# Patient Record
Sex: FEMALE | Race: WHITE | Employment: UNEMPLOYED | URBAN - METROPOLITAN AREA
[De-identification: names, ages, dates, MRNs, and addresses within clinical notes are randomized per-mention and may not be internally consistent; named-entity substitution may affect disease eponyms.]

---

## 2018-12-14 ENCOUNTER — OFFICE VISIT (OUTPATIENT)
Dept: FAMILY MEDICINE CLINIC | Facility: CLINIC | Age: 38
End: 2018-12-14

## 2018-12-14 ENCOUNTER — TELEPHONE (OUTPATIENT)
Dept: OBGYN CLINIC | Facility: CLINIC | Age: 38
End: 2018-12-14

## 2018-12-14 VITALS
BODY MASS INDEX: 17.97 KG/M2 | HEIGHT: 63.7 IN | TEMPERATURE: 98 F | WEIGHT: 104 LBS | HEART RATE: 90 BPM | DIASTOLIC BLOOD PRESSURE: 65 MMHG | SYSTOLIC BLOOD PRESSURE: 101 MMHG

## 2018-12-14 DIAGNOSIS — Z32.01 PREGNANCY TEST POSITIVE: Primary | ICD-10-CM

## 2018-12-14 PROCEDURE — 99202 OFFICE O/P NEW SF 15 MIN: CPT | Performed by: FAMILY MEDICINE

## 2018-12-14 PROCEDURE — 99212 OFFICE O/P EST SF 10 MIN: CPT | Performed by: FAMILY MEDICINE

## 2018-12-14 PROCEDURE — 81025 URINE PREGNANCY TEST: CPT | Performed by: FAMILY MEDICINE

## 2018-12-14 RX ORDER — SWAB
1 SWAB, NON-MEDICATED MISCELLANEOUS DAILY
Qty: 90 TABLET | Refills: 3 | Status: SHIPPED | OUTPATIENT
Start: 2018-12-14 | End: 2019-01-13

## 2018-12-14 NOTE — PROGRESS NOTES
HPI:    Patient ID: Jair Jiang is a 45year old female. Patient had positive home pregnancy test.         Review of Systems   Constitutional: Negative. Negative for activity change, chills, diaphoresis and fatigue. HENT: Negative.   Negative for c

## 2018-12-14 NOTE — TELEPHONE ENCOUNTER
Pt states that she had a home positive and a positive test with Dr. Demetrio Fields. Pt agrees to see all the MDs. Pt will call if she has any problems. lmp 11/10. Pt states that her periods are every 28-30 days. Pt is taking a pnv.

## 2019-01-07 ENCOUNTER — NURSE ONLY (OUTPATIENT)
Dept: OBGYN CLINIC | Facility: CLINIC | Age: 39
End: 2019-01-07

## 2019-01-07 ENCOUNTER — TELEPHONE (OUTPATIENT)
Dept: OBGYN CLINIC | Facility: CLINIC | Age: 39
End: 2019-01-07

## 2019-01-07 VITALS — WEIGHT: 106.38 LBS | BODY MASS INDEX: 18.39 KG/M2 | HEIGHT: 63.9 IN

## 2019-01-07 DIAGNOSIS — Z34.81 ENCOUNTER FOR SUPERVISION OF OTHER NORMAL PREGNANCY IN FIRST TRIMESTER: Primary | ICD-10-CM

## 2019-01-07 DIAGNOSIS — O09.521 MULTIGRAVIDA OF ADVANCED MATERNAL AGE IN FIRST TRIMESTER: Primary | ICD-10-CM

## 2019-01-07 NOTE — PROGRESS NOTES
Pt seen for OBN appt today with no complaints. Normal PN labs ordered plus 1 hr gtt, 24 hour urine protein, and hgba1c. Pt advised all labs must be completed and resulted prior to MD appt. Pt accepted new ob appt with [facility] on 1/16.      Partner's name is Retardation/Autism No    Muscular Dystrophy No    Neural tube defects No    Sickle Cell Disease or trait No    Tigre-Sachs Disease No    Thalassemia No    Other inherited genetic or chromosomal disorders No    Patient or baby's father had a child with birth

## 2019-01-09 ENCOUNTER — LAB ENCOUNTER (OUTPATIENT)
Dept: LAB | Facility: HOSPITAL | Age: 39
End: 2019-01-09
Attending: OBSTETRICS & GYNECOLOGY
Payer: COMMERCIAL

## 2019-01-09 ENCOUNTER — TELEPHONE (OUTPATIENT)
Dept: OBGYN CLINIC | Facility: CLINIC | Age: 39
End: 2019-01-09

## 2019-01-09 DIAGNOSIS — Z34.81 ENCOUNTER FOR SUPERVISION OF OTHER NORMAL PREGNANCY IN FIRST TRIMESTER: ICD-10-CM

## 2019-01-09 LAB
ANTIBODY SCREEN: NEGATIVE
BASOPHILS # BLD: 0 K/UL (ref 0–0.2)
BASOPHILS NFR BLD: 1 %
EOSINOPHIL # BLD: 0.1 K/UL (ref 0–0.7)
EOSINOPHIL NFR BLD: 2 %
ERYTHROCYTE [DISTWIDTH] IN BLOOD BY AUTOMATED COUNT: 13.8 % (ref 11–15)
EST. AVERAGE GLUCOSE BLD GHB EST-MCNC: 100 MG/DL (ref 68–126)
GLUCOSE 1H P 50 G GLC PO SERPL-MCNC: 140 MG/DL
HBA1C MFR BLD HPLC: 5.1 % (ref ?–5.7)
HBV SURFACE AG SERPL QL IA: NONREACTIVE
HCT VFR BLD AUTO: 37.5 % (ref 35–48)
HGB BLD-MCNC: 12.7 G/DL (ref 12–16)
HIV1+2 AB SERPL QL IA: NONREACTIVE
LYMPHOCYTES # BLD: 1.7 K/UL (ref 1–4)
LYMPHOCYTES NFR BLD: 26 %
MCH RBC QN AUTO: 31.7 PG (ref 27–32)
MCHC RBC AUTO-ENTMCNC: 33.9 G/DL (ref 32–37)
MCV RBC AUTO: 93.4 FL (ref 80–100)
MONOCYTES # BLD: 0.4 K/UL (ref 0–1)
MONOCYTES NFR BLD: 6 %
NEUTROPHILS # BLD AUTO: 4.4 K/UL (ref 1.8–7.7)
NEUTROPHILS NFR BLD: 65 %
PLATELET # BLD AUTO: 207 K/UL (ref 140–400)
PMV BLD AUTO: 8.8 FL (ref 7.4–10.3)
RBC # BLD AUTO: 4.01 M/UL (ref 3.7–5.4)
RH BLOOD TYPE: POSITIVE
RUBV IGG SER-ACNC: 28.8 IU/ML
T PALLIDUM AB SER QL: NEGATIVE
WBC # BLD AUTO: 6.7 K/UL (ref 4–11)

## 2019-01-09 PROCEDURE — 36415 COLL VENOUS BLD VENIPUNCTURE: CPT

## 2019-01-09 PROCEDURE — 87389 HIV-1 AG W/HIV-1&-2 AB AG IA: CPT

## 2019-01-09 PROCEDURE — 87086 URINE CULTURE/COLONY COUNT: CPT

## 2019-01-09 PROCEDURE — 86850 RBC ANTIBODY SCREEN: CPT

## 2019-01-09 PROCEDURE — 86780 TREPONEMA PALLIDUM: CPT

## 2019-01-09 PROCEDURE — 83036 HEMOGLOBIN GLYCOSYLATED A1C: CPT

## 2019-01-09 PROCEDURE — 85025 COMPLETE CBC W/AUTO DIFF WBC: CPT

## 2019-01-09 PROCEDURE — 86762 RUBELLA ANTIBODY: CPT

## 2019-01-09 PROCEDURE — 87340 HEPATITIS B SURFACE AG IA: CPT

## 2019-01-09 PROCEDURE — 82950 GLUCOSE TEST: CPT

## 2019-01-09 PROCEDURE — 86901 BLOOD TYPING SEROLOGIC RH(D): CPT

## 2019-01-09 PROCEDURE — 86900 BLOOD TYPING SEROLOGIC ABO: CPT

## 2019-01-09 NOTE — TELEPHONE ENCOUNTER
I saw pt for OBN on 1/7 and ordered 24 hour urine protein for her. Realized that pt only needs this if she has PREGESTATIONAL diabetes. Pt has a hx of gestational diabetes and does not have diabetes outside of pregnancy.  Called pt and informed her that she

## 2019-01-16 ENCOUNTER — INITIAL PRENATAL (OUTPATIENT)
Dept: OBGYN CLINIC | Facility: CLINIC | Age: 39
End: 2019-01-16

## 2019-01-16 VITALS
DIASTOLIC BLOOD PRESSURE: 69 MMHG | SYSTOLIC BLOOD PRESSURE: 109 MMHG | BODY MASS INDEX: 18 KG/M2 | WEIGHT: 106 LBS | HEART RATE: 96 BPM

## 2019-01-16 DIAGNOSIS — O99.810 ABNORMAL MATERNAL GLUCOSE TOLERANCE, ANTEPARTUM: ICD-10-CM

## 2019-01-16 DIAGNOSIS — Z11.3 SCREEN FOR STD (SEXUALLY TRANSMITTED DISEASE): ICD-10-CM

## 2019-01-16 DIAGNOSIS — Z34.91 ENCOUNTER FOR SUPERVISION OF NORMAL PREGNANCY IN FIRST TRIMESTER, UNSPECIFIED GRAVIDITY: Primary | ICD-10-CM

## 2019-01-16 PROBLEM — O09.529 ANTEPARTUM MULTIGRAVIDA OF ADVANCED MATERNAL AGE: Status: ACTIVE | Noted: 2019-01-16

## 2019-01-16 PROBLEM — Z98.891 PREVIOUS CESAREAN SECTION: Status: ACTIVE | Noted: 2019-01-16

## 2019-01-16 LAB
MULTISTIX LOT#: NORMAL NUMERIC
PH, URINE: 5 (ref 4.5–8)
SPECIFIC GRAVITY: 1.02 (ref 1–1.03)

## 2019-01-16 PROCEDURE — 81002 URINALYSIS NONAUTO W/O SCOPE: CPT | Performed by: OBSTETRICS & GYNECOLOGY

## 2019-01-16 PROCEDURE — 76801 OB US < 14 WKS SINGLE FETUS: CPT | Performed by: OBSTETRICS & GYNECOLOGY

## 2019-01-16 RX ORDER — CHOLECALCIFEROL (VITAMIN D3) 25 MCG
1 TABLET,CHEWABLE ORAL DAILY
Qty: 90 CAPSULE | Refills: 0 | Status: SHIPPED | OUTPATIENT
Start: 2019-01-16 | End: 2019-04-13

## 2019-01-16 NOTE — PROGRESS NOTES
abnl GTT- needs 3 hr GTT, will get pap record from last year at outside clinic, neep op report from [facility]- pt wants repeat c/s, has FTS scheduled for feb, gc/chlam/trich done

## 2019-01-17 LAB
C TRACH DNA SPEC QL NAA+PROBE: NEGATIVE
N GONORRHOEA DNA SPEC QL NAA+PROBE: NEGATIVE

## 2019-01-18 ENCOUNTER — LABORATORY ENCOUNTER (OUTPATIENT)
Dept: LAB | Facility: HOSPITAL | Age: 39
End: 2019-01-18
Attending: OBSTETRICS & GYNECOLOGY
Payer: COMMERCIAL

## 2019-01-18 ENCOUNTER — TELEPHONE (OUTPATIENT)
Dept: OBGYN CLINIC | Facility: CLINIC | Age: 39
End: 2019-01-18

## 2019-01-18 DIAGNOSIS — R73.09 ELEVATED GLUCOSE: ICD-10-CM

## 2019-01-18 DIAGNOSIS — R73.09 ELEVATED GLUCOSE: Primary | ICD-10-CM

## 2019-01-18 LAB
GLUCOSE 1H P GLC SERPL-MCNC: 146 MG/DL
GLUCOSE 2H P GLC SERPL-MCNC: 118 MG/DL
GLUCOSE 3H P GLC SERPL-MCNC: 95 MG/DL
GLUCOSE P FAST SERPL-MCNC: 78 MG/DL (ref 70–99)
T VAGINALIS RRNA SPEC QL NAA+PROBE: NEGATIVE

## 2019-01-18 PROCEDURE — 82951 GLUCOSE TOLERANCE TEST (GTT): CPT

## 2019-01-18 PROCEDURE — 36415 COLL VENOUS BLD VENIPUNCTURE: CPT

## 2019-01-18 PROCEDURE — 82952 GTT-ADDED SAMPLES: CPT

## 2019-01-27 ENCOUNTER — TELEPHONE (OUTPATIENT)
Dept: OBGYN CLINIC | Facility: CLINIC | Age: 39
End: 2019-01-27

## 2019-01-28 ENCOUNTER — TELEPHONE (OUTPATIENT)
Dept: OBGYN CLINIC | Facility: CLINIC | Age: 39
End: 2019-01-28

## 2019-01-28 ENCOUNTER — OFFICE VISIT (OUTPATIENT)
Dept: FAMILY MEDICINE CLINIC | Facility: CLINIC | Age: 39
End: 2019-01-28

## 2019-01-28 VITALS
BODY MASS INDEX: 19 KG/M2 | WEIGHT: 108 LBS | HEART RATE: 102 BPM | SYSTOLIC BLOOD PRESSURE: 115 MMHG | TEMPERATURE: 98 F | DIASTOLIC BLOOD PRESSURE: 72 MMHG

## 2019-01-28 DIAGNOSIS — B00.9 HERPES SIMPLEX TYPE 1 INFECTION: Primary | ICD-10-CM

## 2019-01-28 PROCEDURE — 99213 OFFICE O/P EST LOW 20 MIN: CPT | Performed by: FAMILY MEDICINE

## 2019-01-28 PROCEDURE — 99212 OFFICE O/P EST SF 10 MIN: CPT | Performed by: FAMILY MEDICINE

## 2019-01-28 NOTE — PROGRESS NOTES
HPI:    Patient ID: Laurent Lomas is a 45year old female. HPI  Patient presents with:  Lesion: cold sore on upper lip  two months pregnant. No other lesions. Review of Systems   Constitutional: Negative. HENT: Negative.     Skin:        Right side

## 2019-01-28 NOTE — TELEPHONE ENCOUNTER
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 Would like to know if she needs to come in or if there's anything she can be prescribed.  pls adv

## 2019-01-28 NOTE — TELEPHONE ENCOUNTER
She states that she has had this before, but not with this pregnancy. Asked pt who diag her and she stated herself. Informed pt to see her pcp today, so she can be cultured by pcp. Informed pt to follow up with us.

## 2019-01-28 NOTE — TELEPHONE ENCOUNTER
Please see my previous note. Patient told me yesterday she has never been dx with oral herpes. I encouraged her to see her PCP.   If this isn't try and she has the dx then okay for valtrex

## 2019-01-28 NOTE — TELEPHONE ENCOUNTER
C/O HERPES ON HER LIP EVERY YEAR OR MORE. NEEDS RX. PT AWARE I WILL CHECK WITH TANISHA (ON CALL) FOR RX.

## 2019-02-02 NOTE — PROGRESS NOTES
Outpatient Maternal-Fetal Medicine Consultation    Dear Dr. Elizabeth Barnett    Thank you for requesting ultrasound evaluation and maternal fetal medicine consultation on your patient Dilan Worley.   As you are aware she is a 45year old female  with a singleto with the patient.     DISCUSSION  During her visit we discussed and reviewed the following issues:  ADVANCED MATERNAL AGE    Background  I reviewed with the patient that pregnancies in women of advanced maternal age (28 or older at delivery) are associated aneuploidy amongst high-risk patients. The limitations of detailed mid-trimester sonography was reviewed with the patient.   First trimester screening and second trimester multiple-marker serum serum screening as alternative aneuploidy screening options we

## 2019-02-04 ENCOUNTER — HOSPITAL ENCOUNTER (OUTPATIENT)
Dept: PERINATAL CARE | Facility: HOSPITAL | Age: 39
Discharge: HOME | End: 2019-02-04
Attending: OBSTETRICS & GYNECOLOGY
Payer: COMMERCIAL

## 2019-02-04 VITALS
HEART RATE: 94 BPM | DIASTOLIC BLOOD PRESSURE: 59 MMHG | BODY MASS INDEX: 19 KG/M2 | WEIGHT: 108 LBS | SYSTOLIC BLOOD PRESSURE: 94 MMHG

## 2019-02-04 DIAGNOSIS — O09.521 AMA (ADVANCED MATERNAL AGE) MULTIGRAVIDA 35+, FIRST TRIMESTER: ICD-10-CM

## 2019-02-04 DIAGNOSIS — Z36.9 FIRST TRIMESTER SCREENING: Primary | ICD-10-CM

## 2019-02-04 DIAGNOSIS — Z36.9 FIRST TRIMESTER SCREENING: ICD-10-CM

## 2019-02-04 DIAGNOSIS — Z98.891 PREVIOUS CESAREAN SECTION: ICD-10-CM

## 2019-02-04 DIAGNOSIS — O09.529 ANTEPARTUM MULTIGRAVIDA OF ADVANCED MATERNAL AGE: ICD-10-CM

## 2019-02-04 PROCEDURE — 99242 OFF/OP CONSLTJ NEW/EST SF 20: CPT | Performed by: OBSTETRICS & GYNECOLOGY

## 2019-02-04 PROCEDURE — 76813 OB US NUCHAL MEAS 1 GEST: CPT | Performed by: OBSTETRICS & GYNECOLOGY

## 2019-02-08 ENCOUNTER — TELEPHONE (OUTPATIENT)
Dept: OBGYN CLINIC | Facility: CLINIC | Age: 39
End: 2019-02-08

## 2019-02-08 ENCOUNTER — TELEPHONE (OUTPATIENT)
Dept: PERINATAL CARE | Facility: HOSPITAL | Age: 39
End: 2019-02-08

## 2019-02-08 NOTE — TELEPHONE ENCOUNTER
HARMONY screening results obt  Reviewed by MD burrell  Low risk for  Trisomy 21  1:[id number] risk  Low risk for Trisomy 18/13  1:[id number] risk    Fetal sex: boy    Pt states understanding  Copy of results sent for scanning into pt record

## 2019-02-13 ENCOUNTER — TELEPHONE (OUTPATIENT)
Dept: OBGYN CLINIC | Facility: CLINIC | Age: 39
End: 2019-02-13

## 2019-02-13 ENCOUNTER — ROUTINE PRENATAL (OUTPATIENT)
Dept: OBGYN CLINIC | Facility: CLINIC | Age: 39
End: 2019-02-13

## 2019-02-13 VITALS
DIASTOLIC BLOOD PRESSURE: 62 MMHG | SYSTOLIC BLOOD PRESSURE: 94 MMHG | BODY MASS INDEX: 19 KG/M2 | WEIGHT: 109 LBS | HEART RATE: 84 BPM

## 2019-02-13 DIAGNOSIS — O09.522 MULTIGRAVIDA OF ADVANCED MATERNAL AGE IN SECOND TRIMESTER: Primary | ICD-10-CM

## 2019-02-13 DIAGNOSIS — Z34.91 ENCOUNTER FOR SUPERVISION OF NORMAL PREGNANCY IN FIRST TRIMESTER, UNSPECIFIED GRAVIDITY: Primary | ICD-10-CM

## 2019-02-13 PROBLEM — Z86.32 H/O GESTATIONAL DIABETES IN PRIOR PREGNANCY, CURRENTLY PREGNANT: Status: ACTIVE | Noted: 2019-02-13

## 2019-02-13 PROBLEM — O09.299 H/O GESTATIONAL DIABETES IN PRIOR PREGNANCY, CURRENTLY PREGNANT: Status: ACTIVE | Noted: 2019-02-13

## 2019-02-13 LAB
APPEARANCE: CLEAR
MULTISTIX LOT#: NORMAL NUMERIC
PH, URINE: 5 (ref 4.5–8)
SPECIFIC GRAVITY: 1.03 (ref 1–1.03)
URINE-COLOR: YELLOW

## 2019-02-13 PROCEDURE — 81002 URINALYSIS NONAUTO W/O SCOPE: CPT | Performed by: OBSTETRICS & GYNECOLOGY

## 2019-02-13 NOTE — PROGRESS NOTES
No issues. Normal cfDNA. Discussed msAFP 16-20 wks. Needs level 2. Recived op note from [facility]. It appears  A1GDM, Polyhdramnios, oblique presentation, CPD.    RTC 4 wk

## 2019-02-13 NOTE — TELEPHONE ENCOUNTER
LMTCB. Spoke with [facility] who saw pt at appt yesterday. TANISHA stated that we received her pap results from [facility] but they were \"unreadable\". TANISHA stated that per pt \"all her pap smears have been normal\", so that's why a pap wasn't done at initial PN appt.   [place]

## 2019-02-13 NOTE — TELEPHONE ENCOUNTER
Pap stated she wants to schedule a pap smear because it was in another language. Please follow up to advise.

## 2019-02-14 NOTE — TELEPHONE ENCOUNTER
Pt states she was not asking about AFP test. Pt states she was unsure if TANISHA wanted pt to have pap smear done at next PN appt. Pt states she has no history of abnormal pap smears.  Pt states if he feels like she needs another pap smear, she is ok with that

## 2019-02-20 NOTE — TELEPHONE ENCOUNTER
Informed pt that Gayla Hilario stated she should have pap. Pt states that she will come in before her next noah to have the pap done.

## 2019-02-22 ENCOUNTER — ROUTINE PRENATAL (OUTPATIENT)
Dept: OBGYN CLINIC | Facility: CLINIC | Age: 39
End: 2019-02-22

## 2019-02-22 VITALS
DIASTOLIC BLOOD PRESSURE: 64 MMHG | BODY MASS INDEX: 19 KG/M2 | HEART RATE: 81 BPM | WEIGHT: 110.19 LBS | SYSTOLIC BLOOD PRESSURE: 98 MMHG

## 2019-02-22 DIAGNOSIS — Z12.4 SCREENING FOR MALIGNANT NEOPLASM OF CERVIX: ICD-10-CM

## 2019-02-22 DIAGNOSIS — Z34.92 ENCOUNTER FOR SUPERVISION OF NORMAL PREGNANCY IN SECOND TRIMESTER, UNSPECIFIED GRAVIDITY: Primary | ICD-10-CM

## 2019-02-22 LAB
APPEARANCE: CLEAR
MULTISTIX LOT#: NORMAL NUMERIC
PH, URINE: 6.5 (ref 4.5–8)
SPECIFIC GRAVITY: 1.01 (ref 1–1.03)
URINE-COLOR: YELLOW
UROBILINOGEN,SEMI-QN: 0.2 MG/DL (ref 0–1.9)

## 2019-02-22 PROCEDURE — 81002 URINALYSIS NONAUTO W/O SCOPE: CPT | Performed by: OBSTETRICS & GYNECOLOGY

## 2019-02-22 NOTE — PROGRESS NOTES
Pt here for pap today. Pap results from [facility] unable to be interpreted. Pap performed. +FHT. Has level 2 scheduled. RTC routine appt.

## 2019-02-24 LAB — HPV I/H RISK 1 DNA SPEC QL NAA+PROBE: POSITIVE

## 2019-02-25 ENCOUNTER — TELEPHONE (OUTPATIENT)
Dept: OBGYN CLINIC | Facility: CLINIC | Age: 39
End: 2019-02-25

## 2019-02-25 NOTE — TELEPHONE ENCOUNTER
Notes recorded by Emily Crawford DO on 2/25/2019 at 4:17 PM CST  ASCUS HPV+ pap in pregnant patient. Needs colpo with any MD ASAP given 17 wks. Pt informed of MAZs recs and verbalized understanding. Explained colpo to pt.  Pt informed that since she

## 2019-02-28 ENCOUNTER — OFFICE VISIT (OUTPATIENT)
Dept: OBGYN CLINIC | Facility: CLINIC | Age: 39
End: 2019-02-28

## 2019-02-28 VITALS
BODY MASS INDEX: 19 KG/M2 | DIASTOLIC BLOOD PRESSURE: 66 MMHG | WEIGHT: 112 LBS | SYSTOLIC BLOOD PRESSURE: 108 MMHG | HEART RATE: 85 BPM

## 2019-02-28 DIAGNOSIS — R87.610 PAPANICOLAOU SMEAR OF CERVIX WITH ATYPICAL SQUAMOUS CELLS OF UNDETERMINED SIGNIFICANCE (ASC-US): ICD-10-CM

## 2019-02-28 DIAGNOSIS — Z34.92 ENCOUNTER FOR SUPERVISION OF NORMAL PREGNANCY IN SECOND TRIMESTER, UNSPECIFIED GRAVIDITY: Primary | ICD-10-CM

## 2019-02-28 PROBLEM — R87.810 ASCUS WITH POSITIVE HIGH RISK HPV CERVICAL: Status: ACTIVE | Noted: 2019-02-28

## 2019-02-28 PROCEDURE — 57452 EXAM OF CERVIX W/SCOPE: CPT | Performed by: OBSTETRICS & GYNECOLOGY

## 2019-02-28 NOTE — PROCEDURES
Colposcopy-- Prenatal pt    Consent signed. Procedure discussed with patient in detail including indication, risk, benefits, alternatives and complications. Indication: Prenatal pt; aSCUS HPV+    Procedure:   The patient was placed in the dorsal litho

## 2019-03-13 ENCOUNTER — ROUTINE PRENATAL (OUTPATIENT)
Dept: OBGYN CLINIC | Facility: CLINIC | Age: 39
End: 2019-03-13

## 2019-03-13 VITALS
DIASTOLIC BLOOD PRESSURE: 58 MMHG | HEART RATE: 85 BPM | WEIGHT: 114 LBS | BODY MASS INDEX: 20 KG/M2 | SYSTOLIC BLOOD PRESSURE: 92 MMHG

## 2019-03-13 DIAGNOSIS — Z34.92 ENCOUNTER FOR SUPERVISION OF NORMAL PREGNANCY IN SECOND TRIMESTER, UNSPECIFIED GRAVIDITY: Primary | ICD-10-CM

## 2019-03-13 PROBLEM — Z33.1 PREGNANT STATE, INCIDENTAL: Status: ACTIVE | Noted: 2019-03-13

## 2019-03-13 LAB
MULTISTIX LOT#: NORMAL NUMERIC
PH, URINE: 5 (ref 4.5–8)
SPECIFIC GRAVITY: 1.03 (ref 1–1.03)
UROBILINOGEN,SEMI-QN: 0 MG/DL (ref 0–1.9)

## 2019-03-13 PROCEDURE — 81002 URINALYSIS NONAUTO W/O SCOPE: CPT | Performed by: OBSTETRICS & GYNECOLOGY

## 2019-03-17 NOTE — PROGRESS NOTES
Nba Elkins    Dear Dr. Liliya Dudley    Thank you for requesting an ultrasound and maternal-fetal medicine consultation on your patient Eulogio Rose.   As you are aware she is a 45year old female  with a sylvester pregnanc

## 2019-03-18 ENCOUNTER — HOSPITAL ENCOUNTER (OUTPATIENT)
Dept: PERINATAL CARE | Facility: HOSPITAL | Age: 39
Discharge: HOME | End: 2019-03-18
Attending: OBSTETRICS & GYNECOLOGY
Payer: COMMERCIAL

## 2019-03-18 VITALS
BODY MASS INDEX: 20.2 KG/M2 | HEART RATE: 111 BPM | WEIGHT: 114 LBS | SYSTOLIC BLOOD PRESSURE: 112 MMHG | HEIGHT: 63 IN | DIASTOLIC BLOOD PRESSURE: 70 MMHG

## 2019-03-18 DIAGNOSIS — R87.610 ASCUS WITH POSITIVE HIGH RISK HPV CERVICAL: ICD-10-CM

## 2019-03-18 DIAGNOSIS — R87.810 ASCUS WITH POSITIVE HIGH RISK HPV CERVICAL: ICD-10-CM

## 2019-03-18 DIAGNOSIS — O09.299 H/O GESTATIONAL DIABETES IN PRIOR PREGNANCY, CURRENTLY PREGNANT: ICD-10-CM

## 2019-03-18 DIAGNOSIS — Z86.32 H/O GESTATIONAL DIABETES IN PRIOR PREGNANCY, CURRENTLY PREGNANT: ICD-10-CM

## 2019-03-18 DIAGNOSIS — Z98.891 PREVIOUS CESAREAN SECTION: ICD-10-CM

## 2019-03-18 DIAGNOSIS — O09.529 ANTEPARTUM MULTIGRAVIDA OF ADVANCED MATERNAL AGE: ICD-10-CM

## 2019-03-18 DIAGNOSIS — O09.529 ANTEPARTUM MULTIGRAVIDA OF ADVANCED MATERNAL AGE: Primary | ICD-10-CM

## 2019-03-18 PROCEDURE — 76811 OB US DETAILED SNGL FETUS: CPT | Performed by: OBSTETRICS & GYNECOLOGY

## 2019-03-18 PROCEDURE — 99215 OFFICE O/P EST HI 40 MIN: CPT | Performed by: OBSTETRICS & GYNECOLOGY

## 2019-03-20 ENCOUNTER — TELEPHONE (OUTPATIENT)
Dept: OBGYN CLINIC | Facility: CLINIC | Age: 39
End: 2019-03-20

## 2019-03-20 DIAGNOSIS — O09.522 MULTIGRAVIDA OF ADVANCED MATERNAL AGE IN SECOND TRIMESTER: Primary | ICD-10-CM

## 2019-04-10 ENCOUNTER — ROUTINE PRENATAL (OUTPATIENT)
Dept: OBGYN CLINIC | Facility: CLINIC | Age: 39
End: 2019-04-10

## 2019-04-10 VITALS
DIASTOLIC BLOOD PRESSURE: 67 MMHG | HEART RATE: 71 BPM | BODY MASS INDEX: 21 KG/M2 | WEIGHT: 118 LBS | SYSTOLIC BLOOD PRESSURE: 104 MMHG

## 2019-04-10 DIAGNOSIS — Z34.92 ENCOUNTER FOR SUPERVISION OF NORMAL PREGNANCY IN SECOND TRIMESTER, UNSPECIFIED GRAVIDITY: Primary | ICD-10-CM

## 2019-04-10 PROCEDURE — 81002 URINALYSIS NONAUTO W/O SCOPE: CPT | Performed by: OBSTETRICS & GYNECOLOGY

## 2019-04-11 NOTE — PROGRESS NOTES
Felicitas Silverio is well. [facility] scheduled for 32 weeks. She brought copy of translated procedure note dated 12-23-15. She had Colpo with multiple biopsies. No path report but she states it was negative. No LEEP done. Signed and sent for scan.

## 2019-04-13 RX ORDER — CHOLECALCIFEROL (VITAMIN D3) 25 MCG
TABLET,CHEWABLE ORAL
Qty: 30 CAPSULE | Refills: 3 | Status: SHIPPED | OUTPATIENT
Start: 2019-04-13 | End: 2019-05-16

## 2019-04-24 ENCOUNTER — APPOINTMENT (OUTPATIENT)
Dept: LAB | Facility: HOSPITAL | Age: 39
End: 2019-04-24
Attending: OBSTETRICS & GYNECOLOGY
Payer: COMMERCIAL

## 2019-04-24 DIAGNOSIS — Z34.92 ENCOUNTER FOR SUPERVISION OF NORMAL PREGNANCY IN SECOND TRIMESTER, UNSPECIFIED GRAVIDITY: ICD-10-CM

## 2019-04-24 PROCEDURE — 36415 COLL VENOUS BLD VENIPUNCTURE: CPT

## 2019-04-24 PROCEDURE — 82950 GLUCOSE TEST: CPT

## 2019-04-24 PROCEDURE — 85027 COMPLETE CBC AUTOMATED: CPT

## 2019-05-08 ENCOUNTER — ROUTINE PRENATAL (OUTPATIENT)
Dept: OBGYN CLINIC | Facility: CLINIC | Age: 39
End: 2019-05-08

## 2019-05-08 ENCOUNTER — TELEPHONE (OUTPATIENT)
Dept: OBGYN CLINIC | Facility: CLINIC | Age: 39
End: 2019-05-08

## 2019-05-08 VITALS
WEIGHT: 125 LBS | SYSTOLIC BLOOD PRESSURE: 99 MMHG | BODY MASS INDEX: 22 KG/M2 | DIASTOLIC BLOOD PRESSURE: 61 MMHG | HEART RATE: 73 BPM

## 2019-05-08 DIAGNOSIS — Z01.818 PRE-OP TESTING: Primary | ICD-10-CM

## 2019-05-08 DIAGNOSIS — Z34.93 ENCOUNTER FOR SUPERVISION OF NORMAL PREGNANCY IN THIRD TRIMESTER, UNSPECIFIED GRAVIDITY: Primary | ICD-10-CM

## 2019-05-08 PROCEDURE — 81002 URINALYSIS NONAUTO W/O SCOPE: CPT | Performed by: OBSTETRICS & GYNECOLOGY

## 2019-05-08 NOTE — TELEPHONE ENCOUNTER
Please schedule the following surgery:    Procedure: Repeat  section    Date: 39 weeks 19 in am    Diagnosis: Previous  section    Admission: AM Admit    Anesth: Spinal    IPA tubal / hyst form signed: No    Comments / Orders to Nurse:

## 2019-05-08 NOTE — PROGRESS NOTES
C/o \"bruise in right groin\" She has some tenderness with it. She does have right varicose veins in her groin. Reviewed compression belt. CS order sent.  Reviewed kick counts  RTC 2 wks

## 2019-05-09 NOTE — TELEPHONE ENCOUNTER
Patient scheduled 7/29/19 9:30am anny GARAY. Pat orders routed. Instructions routed via my chart. Instructions routed my chart. Patient informed. rpw requested.

## 2019-05-13 RX ORDER — CHOLECALCIFEROL (VITAMIN D3) 25 MCG
1 TABLET,CHEWABLE ORAL
Qty: 30 CAPSULE | Refills: 3 | OUTPATIENT
Start: 2019-05-13

## 2019-05-16 RX ORDER — CHOLECALCIFEROL (VITAMIN D3) 25 MCG
1 TABLET,CHEWABLE ORAL
Qty: 30 CAPSULE | Refills: 5 | Status: SHIPPED | OUTPATIENT
Start: 2019-05-16 | End: 2019-06-14

## 2019-05-21 ENCOUNTER — ROUTINE PRENATAL (OUTPATIENT)
Dept: OBGYN CLINIC | Facility: CLINIC | Age: 39
End: 2019-05-21

## 2019-05-21 VITALS
DIASTOLIC BLOOD PRESSURE: 62 MMHG | BODY MASS INDEX: 22 KG/M2 | WEIGHT: 125 LBS | HEART RATE: 74 BPM | SYSTOLIC BLOOD PRESSURE: 99 MMHG

## 2019-05-21 DIAGNOSIS — Z34.93 ENCOUNTER FOR SUPERVISION OF NORMAL PREGNANCY IN THIRD TRIMESTER, UNSPECIFIED GRAVIDITY: Primary | ICD-10-CM

## 2019-05-21 PROCEDURE — 90715 TDAP VACCINE 7 YRS/> IM: CPT | Performed by: OBSTETRICS & GYNECOLOGY

## 2019-05-21 PROCEDURE — 90471 IMMUNIZATION ADMIN: CPT | Performed by: OBSTETRICS & GYNECOLOGY

## 2019-05-21 PROCEDURE — 81002 URINALYSIS NONAUTO W/O SCOPE: CPT | Performed by: OBSTETRICS & GYNECOLOGY

## 2019-06-04 ENCOUNTER — ROUTINE PRENATAL (OUTPATIENT)
Dept: OBGYN CLINIC | Facility: CLINIC | Age: 39
End: 2019-06-04

## 2019-06-04 VITALS
SYSTOLIC BLOOD PRESSURE: 87 MMHG | BODY MASS INDEX: 22 KG/M2 | WEIGHT: 126.19 LBS | DIASTOLIC BLOOD PRESSURE: 54 MMHG | HEART RATE: 81 BPM

## 2019-06-04 DIAGNOSIS — Z34.93 ENCOUNTER FOR SUPERVISION OF NORMAL PREGNANCY IN THIRD TRIMESTER, UNSPECIFIED GRAVIDITY: Primary | ICD-10-CM

## 2019-06-04 PROCEDURE — 81002 URINALYSIS NONAUTO W/O SCOPE: CPT | Performed by: OBSTETRICS & GYNECOLOGY

## 2019-06-04 NOTE — PROGRESS NOTES
Pt having trouble walking due to groin pain. Happened with last pregnancy as well. No signs of DVT. Pelvic support encouraged. Has growth on Monday. Discussed consideration of sterilization. Reviewed TL vs BS. Pt to talk with her .   RTC 2 wks

## 2019-06-10 ENCOUNTER — HOSPITAL ENCOUNTER (OUTPATIENT)
Dept: PERINATAL CARE | Facility: HOSPITAL | Age: 39
Discharge: HOME | End: 2019-06-10
Attending: OBSTETRICS & GYNECOLOGY
Payer: COMMERCIAL

## 2019-06-10 VITALS
SYSTOLIC BLOOD PRESSURE: 92 MMHG | HEART RATE: 90 BPM | DIASTOLIC BLOOD PRESSURE: 60 MMHG | WEIGHT: 126 LBS | BODY MASS INDEX: 22 KG/M2

## 2019-06-10 DIAGNOSIS — O09.529 ANTEPARTUM MULTIGRAVIDA OF ADVANCED MATERNAL AGE: ICD-10-CM

## 2019-06-10 DIAGNOSIS — O09.529 ANTEPARTUM MULTIGRAVIDA OF ADVANCED MATERNAL AGE: Primary | ICD-10-CM

## 2019-06-10 DIAGNOSIS — O09.299 H/O GESTATIONAL DIABETES IN PRIOR PREGNANCY, CURRENTLY PREGNANT: ICD-10-CM

## 2019-06-10 DIAGNOSIS — Z86.32 H/O GESTATIONAL DIABETES IN PRIOR PREGNANCY, CURRENTLY PREGNANT: ICD-10-CM

## 2019-06-10 PROCEDURE — 76819 FETAL BIOPHYS PROFIL W/O NST: CPT | Performed by: OBSTETRICS & GYNECOLOGY

## 2019-06-10 PROCEDURE — 76816 OB US FOLLOW-UP PER FETUS: CPT | Performed by: OBSTETRICS & GYNECOLOGY

## 2019-06-10 PROCEDURE — 99213 OFFICE O/P EST LOW 20 MIN: CPT | Performed by: OBSTETRICS & GYNECOLOGY

## 2019-06-10 NOTE — PROGRESS NOTES
Joleen Carlson  Dear Dr. Liliya Dudley     Thank you for requesting an ultrasound and maternal-fetal medicine consultation on your patient Eulogio Rose.   As you are aware she is a 45year old female  with a sylvester pregna face-to-face time was 15 minutes.

## 2019-06-15 RX ORDER — CHOLECALCIFEROL (VITAMIN D3) 25 MCG
1 TABLET,CHEWABLE ORAL
Qty: 30 CAPSULE | Refills: 5 | OUTPATIENT
Start: 2019-06-15

## 2019-06-15 RX ORDER — CHOLECALCIFEROL (VITAMIN D3) 25 MCG
1 TABLET,CHEWABLE ORAL
Qty: 30 CAPSULE | Refills: 5 | Status: SHIPPED | OUTPATIENT
Start: 2019-06-15 | End: 2019-12-17

## 2019-06-15 NOTE — TELEPHONE ENCOUNTER
SENT THIS RX BACK TO PHARMACY AS DENIED AND NOTED WE ALREADY SENT AUTHORIZATION FOR REFILLS EARLIER TODAY.

## 2019-06-19 ENCOUNTER — ROUTINE PRENATAL (OUTPATIENT)
Dept: OBGYN CLINIC | Facility: CLINIC | Age: 39
End: 2019-06-19

## 2019-06-19 ENCOUNTER — TELEPHONE (OUTPATIENT)
Dept: OBGYN CLINIC | Facility: CLINIC | Age: 39
End: 2019-06-19

## 2019-06-19 VITALS
HEART RATE: 97 BPM | SYSTOLIC BLOOD PRESSURE: 80 MMHG | WEIGHT: 130.38 LBS | BODY MASS INDEX: 23 KG/M2 | DIASTOLIC BLOOD PRESSURE: 55 MMHG

## 2019-06-19 DIAGNOSIS — O09.523 MULTIGRAVIDA OF ADVANCED MATERNAL AGE IN THIRD TRIMESTER: ICD-10-CM

## 2019-06-19 DIAGNOSIS — Z34.93 ENCOUNTER FOR SUPERVISION OF NORMAL PREGNANCY IN THIRD TRIMESTER, UNSPECIFIED GRAVIDITY: Primary | ICD-10-CM

## 2019-06-19 DIAGNOSIS — Z86.32 H/O GESTATIONAL DIABETES IN PRIOR PREGNANCY, CURRENTLY PREGNANT: Primary | ICD-10-CM

## 2019-06-19 DIAGNOSIS — O09.299 H/O GESTATIONAL DIABETES IN PRIOR PREGNANCY, CURRENTLY PREGNANT: Primary | ICD-10-CM

## 2019-06-19 PROCEDURE — 81002 URINALYSIS NONAUTO W/O SCOPE: CPT | Performed by: OBSTETRICS & GYNECOLOGY

## 2019-07-02 ENCOUNTER — LAB ENCOUNTER (OUTPATIENT)
Dept: LAB | Facility: HOSPITAL | Age: 39
End: 2019-07-02
Attending: OBSTETRICS & GYNECOLOGY
Payer: COMMERCIAL

## 2019-07-02 ENCOUNTER — ROUTINE PRENATAL (OUTPATIENT)
Dept: OBGYN CLINIC | Facility: CLINIC | Age: 39
End: 2019-07-02

## 2019-07-02 ENCOUNTER — TELEPHONE (OUTPATIENT)
Dept: OBGYN CLINIC | Facility: CLINIC | Age: 39
End: 2019-07-02

## 2019-07-02 VITALS
BODY MASS INDEX: 23 KG/M2 | HEART RATE: 78 BPM | WEIGHT: 130.81 LBS | DIASTOLIC BLOOD PRESSURE: 60 MMHG | SYSTOLIC BLOOD PRESSURE: 94 MMHG

## 2019-07-02 DIAGNOSIS — Z34.93 ENCOUNTER FOR SUPERVISION OF NORMAL PREGNANCY IN THIRD TRIMESTER, UNSPECIFIED GRAVIDITY: ICD-10-CM

## 2019-07-02 DIAGNOSIS — R29.898 LEG WEAKNESS, BILATERAL: ICD-10-CM

## 2019-07-02 DIAGNOSIS — Z01.818 PRE-OP TESTING: ICD-10-CM

## 2019-07-02 DIAGNOSIS — Z34.93 ENCOUNTER FOR SUPERVISION OF NORMAL PREGNANCY IN THIRD TRIMESTER, UNSPECIFIED GRAVIDITY: Primary | ICD-10-CM

## 2019-07-02 PROBLEM — M25.552 PAIN OF LEFT HIP JOINT: Status: ACTIVE | Noted: 2019-07-02

## 2019-07-02 LAB
BASOPHILS # BLD AUTO: 0.03 X10(3) UL (ref 0–0.2)
BASOPHILS NFR BLD AUTO: 0.4 %
DEPRECATED RDW RBC AUTO: 49.1 FL (ref 35.1–46.3)
EOSINOPHIL # BLD AUTO: 0.08 X10(3) UL (ref 0–0.7)
EOSINOPHIL NFR BLD AUTO: 1.2 %
ERYTHROCYTE [DISTWIDTH] IN BLOOD BY AUTOMATED COUNT: 13.7 % (ref 11–15)
HCT VFR BLD AUTO: 39.2 % (ref 35–48)
HGB BLD-MCNC: 12.9 G/DL (ref 12–16)
IMM GRANULOCYTES # BLD AUTO: 0.07 X10(3) UL (ref 0–1)
IMM GRANULOCYTES NFR BLD: 1 %
LYMPHOCYTES # BLD AUTO: 1.93 X10(3) UL (ref 1–4)
LYMPHOCYTES NFR BLD AUTO: 28.5 %
MCH RBC QN AUTO: 32 PG (ref 26–34)
MCHC RBC AUTO-ENTMCNC: 32.9 G/DL (ref 31–37)
MCV RBC AUTO: 97.3 FL (ref 80–100)
MONOCYTES # BLD AUTO: 0.56 X10(3) UL (ref 0.1–1)
MONOCYTES NFR BLD AUTO: 8.3 %
MULTISTIX LOT#: NORMAL NUMERIC
NEUTROPHILS # BLD AUTO: 4.1 X10 (3) UL (ref 1.5–7.7)
NEUTROPHILS # BLD AUTO: 4.1 X10(3) UL (ref 1.5–7.7)
NEUTROPHILS NFR BLD AUTO: 60.6 %
PH, URINE: 6 (ref 4.5–8)
PLATELET # BLD AUTO: 184 10(3)UL (ref 150–450)
RBC # BLD AUTO: 4.03 X10(6)UL (ref 3.8–5.3)
SPECIFIC GRAVITY: 1.01 (ref 1–1.03)
UROBILINOGEN,SEMI-QN: 0 MG/DL (ref 0–1.9)
WBC # BLD AUTO: 6.8 X10(3) UL (ref 4–11)

## 2019-07-02 PROCEDURE — 85025 COMPLETE CBC W/AUTO DIFF WBC: CPT

## 2019-07-02 PROCEDURE — 81002 URINALYSIS NONAUTO W/O SCOPE: CPT | Performed by: OBSTETRICS & GYNECOLOGY

## 2019-07-02 PROCEDURE — 86780 TREPONEMA PALLIDUM: CPT

## 2019-07-02 PROCEDURE — 87389 HIV-1 AG W/HIV-1&-2 AB AG IA: CPT

## 2019-07-02 PROCEDURE — 36415 COLL VENOUS BLD VENIPUNCTURE: CPT

## 2019-07-02 NOTE — PROGRESS NOTES
GBS cxs done, Pt c/o severe left hip pain that does not allow her to ambulate- she states she uses this leg as little as possible and can do some motions such as bending her knee but she cannot adduct or abduct her leg- she states that her left leg is too

## 2019-07-02 NOTE — TELEPHONE ENCOUNTER
Critical referral placed for PT due to bilateral leg weakness and pt not able to move her left leg for 1 month. Spoke with Jt Nava in managed care and she will open referral to work on. Await clinicals from [facility] to send to [facility].  Will call PT to see if appt sti

## 2019-07-02 NOTE — TELEPHONE ENCOUNTER
Spoke with pt regarding appt available tomorrow at the [facility] location at [address] [facility] ([address] in [place]). The therapist will be Charisma Stern.  I also informed the pt that this therapist is coming in early to see the pt and that I will need to call manage

## 2019-07-02 NOTE — TELEPHONE ENCOUNTER
Kavon Carroll from Pt informed me that they can see the pt today at either 12:30 or 3:30. I called pt and informed her of the times available. Pt. States that she cannot come back today, that her  has to go to work.  Stressed the importance of being seen

## 2019-07-03 ENCOUNTER — OFFICE VISIT (OUTPATIENT)
Dept: PHYSICAL THERAPY | Age: 39
End: 2019-07-03
Attending: OBSTETRICS & GYNECOLOGY
Payer: COMMERCIAL

## 2019-07-03 DIAGNOSIS — Z34.93 ENCOUNTER FOR SUPERVISION OF NORMAL PREGNANCY IN THIRD TRIMESTER, UNSPECIFIED GRAVIDITY: ICD-10-CM

## 2019-07-03 DIAGNOSIS — R29.898 LEG WEAKNESS, BILATERAL: ICD-10-CM

## 2019-07-03 LAB — T PALLIDUM AB SER QL: NEGATIVE

## 2019-07-03 PROCEDURE — 97161 PT EVAL LOW COMPLEX 20 MIN: CPT

## 2019-07-03 PROCEDURE — 97110 THERAPEUTIC EXERCISES: CPT

## 2019-07-03 NOTE — PROGRESS NOTES
SPINE EVALUATION:   Referring Physician: Dr. Merlyn Rowland  Diagnosis:  L sciatica severe nerve tension on the L  Date of Service: 7/3/2019     PATIENT SUMMARY   Beatriz Pedroza is a 45year old female who presents to therapy today with complaints of pain with weig and symptoms are consistent with diagnosis of  L nerve tension. Pt and PT discussed evaluation findings, pathology, POC and HEP. Pt voiced understanding and performs HEP correctly without reported pain.  Skilled Physical Therapy is medically necessary to evolving  symptoms including pain with walking , transfers on the L LE . PLAN OF CARE:    Goals: (to be met in  10 visits)  1. Pt to be I with HEP with help of spouse  2.  Pt will be educated on transfers and gait to avoid falling    Frequency / Duration: P

## 2019-07-09 ENCOUNTER — HOSPITAL ENCOUNTER (OUTPATIENT)
Dept: PERINATAL CARE | Facility: HOSPITAL | Age: 39
Discharge: HOME | End: 2019-07-09
Attending: OBSTETRICS & GYNECOLOGY
Payer: COMMERCIAL

## 2019-07-09 ENCOUNTER — ROUTINE PRENATAL (OUTPATIENT)
Dept: OBGYN CLINIC | Facility: CLINIC | Age: 39
End: 2019-07-09

## 2019-07-09 VITALS
HEART RATE: 73 BPM | DIASTOLIC BLOOD PRESSURE: 49 MMHG | BODY MASS INDEX: 23 KG/M2 | WEIGHT: 132.38 LBS | SYSTOLIC BLOOD PRESSURE: 97 MMHG

## 2019-07-09 DIAGNOSIS — O09.529 ANTEPARTUM MULTIGRAVIDA OF ADVANCED MATERNAL AGE: Primary | ICD-10-CM

## 2019-07-09 DIAGNOSIS — Z34.93 ENCOUNTER FOR SUPERVISION OF NORMAL PREGNANCY IN THIRD TRIMESTER, UNSPECIFIED GRAVIDITY: Primary | ICD-10-CM

## 2019-07-09 PROBLEM — Z86.32 H/O GESTATIONAL DIABETES IN PRIOR PREGNANCY, CURRENTLY PREGNANT: Status: RESOLVED | Noted: 2019-02-13 | Resolved: 2019-07-09

## 2019-07-09 PROBLEM — O09.299 H/O GESTATIONAL DIABETES IN PRIOR PREGNANCY, CURRENTLY PREGNANT: Status: RESOLVED | Noted: 2019-02-13 | Resolved: 2019-07-09

## 2019-07-09 LAB
MULTISTIX LOT#: NORMAL NUMERIC
PH, URINE: 7 (ref 4.5–8)
SPECIFIC GRAVITY: 1 (ref 1–1.03)
UROBILINOGEN,SEMI-QN: 0.2 MG/DL (ref 0–1.9)

## 2019-07-09 PROCEDURE — 59025 FETAL NON-STRESS TEST: CPT | Performed by: OBSTETRICS & GYNECOLOGY

## 2019-07-09 PROCEDURE — 81002 URINALYSIS NONAUTO W/O SCOPE: CPT | Performed by: OBSTETRICS & GYNECOLOGY

## 2019-07-09 NOTE — NST
Nonstress Test   Patient: Jose Moreno    Gestation: 36w2d    NST: ama       Variability: Moderate           Accelerations: Yes           Decelerations: None            Baseline: 135 BPM           Uterine Irritability: Yes           Contractions: Irregul

## 2019-07-10 ENCOUNTER — OFFICE VISIT (OUTPATIENT)
Dept: PHYSICAL THERAPY | Age: 39
End: 2019-07-10
Attending: OBSTETRICS & GYNECOLOGY
Payer: COMMERCIAL

## 2019-07-10 PROCEDURE — 97110 THERAPEUTIC EXERCISES: CPT

## 2019-07-10 NOTE — PROGRESS NOTES
Dx:  L low back pain with neural tension       Insurance (Authorized # of Visits):  10           Authorizing Physician: Dr. Peace Duke  Next MD visit: none scheduled  Fall Risk: standard         Precautions: n/a             Subjective: pt is stating a lot less pa

## 2019-07-15 NOTE — ADDENDUM NOTE
Encounter addended by: James Mast MD on: 7/15/2019 7:13 AM   Actions taken: Sign clinical note, Charge Capture section accepted

## 2019-07-16 ENCOUNTER — ROUTINE PRENATAL (OUTPATIENT)
Dept: OBGYN CLINIC | Facility: CLINIC | Age: 39
End: 2019-07-16

## 2019-07-16 ENCOUNTER — HOSPITAL ENCOUNTER (OUTPATIENT)
Dept: PERINATAL CARE | Facility: HOSPITAL | Age: 39
Discharge: HOME | End: 2019-07-16
Attending: OBSTETRICS & GYNECOLOGY
Payer: COMMERCIAL

## 2019-07-16 VITALS
HEART RATE: 81 BPM | WEIGHT: 133 LBS | BODY MASS INDEX: 24 KG/M2 | DIASTOLIC BLOOD PRESSURE: 65 MMHG | SYSTOLIC BLOOD PRESSURE: 99 MMHG

## 2019-07-16 DIAGNOSIS — Z34.83 ENCOUNTER FOR SUPERVISION OF OTHER NORMAL PREGNANCY IN THIRD TRIMESTER: Primary | ICD-10-CM

## 2019-07-16 DIAGNOSIS — O09.529 ANTEPARTUM MULTIGRAVIDA OF ADVANCED MATERNAL AGE: Primary | ICD-10-CM

## 2019-07-16 LAB
APPEARANCE: CLEAR
MULTISTIX LOT#: NORMAL NUMERIC
URINE-COLOR: YELLOW

## 2019-07-16 PROCEDURE — 59025 FETAL NON-STRESS TEST: CPT | Performed by: OBSTETRICS & GYNECOLOGY

## 2019-07-16 PROCEDURE — 81002 URINALYSIS NONAUTO W/O SCOPE: CPT | Performed by: OBSTETRICS & GYNECOLOGY

## 2019-07-16 NOTE — NST
Nonstress Test   Patient: Elene Mortimer    Gestation: 37w2d    NST: ama       Variability: Moderate           Accelerations: Yes           Decelerations: None            baseline 135           Uterine Irritability: No           Contractions: Irregular

## 2019-07-21 ENCOUNTER — TELEPHONE (OUTPATIENT)
Dept: OBGYN CLINIC | Facility: CLINIC | Age: 39
End: 2019-07-21

## 2019-07-22 NOTE — TELEPHONE ENCOUNTER
Please let [facility] know we're adding bilateral salpingectomy to  consent. Statement Selected

## 2019-07-23 ENCOUNTER — ROUTINE PRENATAL (OUTPATIENT)
Dept: OBGYN CLINIC | Facility: CLINIC | Age: 39
End: 2019-07-23

## 2019-07-23 ENCOUNTER — TELEPHONE (OUTPATIENT)
Dept: OBGYN CLINIC | Facility: CLINIC | Age: 39
End: 2019-07-23

## 2019-07-23 ENCOUNTER — HOSPITAL ENCOUNTER (OUTPATIENT)
Dept: PERINATAL CARE | Facility: HOSPITAL | Age: 39
Discharge: HOME | End: 2019-07-23
Attending: OBSTETRICS & GYNECOLOGY
Payer: COMMERCIAL

## 2019-07-23 VITALS
WEIGHT: 133 LBS | HEART RATE: 67 BPM | SYSTOLIC BLOOD PRESSURE: 103 MMHG | BODY MASS INDEX: 24 KG/M2 | DIASTOLIC BLOOD PRESSURE: 65 MMHG

## 2019-07-23 DIAGNOSIS — O09.529 ANTEPARTUM MULTIGRAVIDA OF ADVANCED MATERNAL AGE: Primary | ICD-10-CM

## 2019-07-23 DIAGNOSIS — Z34.93 ENCOUNTER FOR SUPERVISION OF NORMAL PREGNANCY IN THIRD TRIMESTER, UNSPECIFIED GRAVIDITY: Primary | ICD-10-CM

## 2019-07-23 DIAGNOSIS — Z30.2 REQUEST FOR STERILIZATION: Primary | ICD-10-CM

## 2019-07-23 LAB
MULTISTIX LOT#: NORMAL NUMERIC
PH, URINE: 6 (ref 4.5–8)
SPECIFIC GRAVITY: 1.01 (ref 1–1.03)

## 2019-07-23 PROCEDURE — 59025 FETAL NON-STRESS TEST: CPT | Performed by: OBSTETRICS & GYNECOLOGY

## 2019-07-23 PROCEDURE — 81002 URINALYSIS NONAUTO W/O SCOPE: CPT | Performed by: OBSTETRICS & GYNECOLOGY

## 2019-07-23 NOTE — PROGRESS NOTES
No acute issues. PT helping left leg. Surgery instructions reviewed. NST this morning. RCS with BS on Monday.

## 2019-07-23 NOTE — NST
Nonstress Test   Patient: Rosa Eddy    Gestation: 38w2d    NST: ama       Variability: Moderate           Accelerations: Yes           Decelerations: None            Baseline: 130 BPM           Uterine Irritability: No           Contractions: Liana Johnsonat

## 2019-07-24 ENCOUNTER — MOBILE ENCOUNTER (OUTPATIENT)
Dept: OBGYN CLINIC | Facility: CLINIC | Age: 39
End: 2019-07-24

## 2019-07-24 ENCOUNTER — TELEPHONE (OUTPATIENT)
Dept: OBGYN CLINIC | Facility: CLINIC | Age: 39
End: 2019-07-24

## 2019-07-24 NOTE — TELEPHONE ENCOUNTER
Paged on call with c/o brown spotting when wiping after urination. No UC's , LOF or red blood. Fetus active. She is scheduled RLTCS with Salpingectomy on 07-29. Instructions given.

## 2019-07-25 ENCOUNTER — TELEPHONE (OUTPATIENT)
Dept: OBGYN CLINIC | Facility: CLINIC | Age: 39
End: 2019-07-25

## 2019-07-25 NOTE — ADDENDUM NOTE
Encounter addended by: Barbara Buck MD on: 7/25/2019 5:26 PM   Actions taken: Sign clinical note, Charge Capture section accepted

## 2019-07-25 NOTE — TELEPHONE ENCOUNTER
Patient 38 wks and called service last night with some brown DC that has been going on all day. No ctx or LOF.  +FM. Labor instructions reviewed. Please call patient and see if she is still having brown DC.   If so, okay to bring for MD appt today

## 2019-07-25 NOTE — TELEPHONE ENCOUNTER
Pt states just woke up. Denies any brown dc overnight. Denies ctx or LOF. Confirms + FM. Advised if anything changes to let us know. States understanding and ok with plan.

## 2019-07-26 ENCOUNTER — TELEPHONE (OUTPATIENT)
Dept: OBGYN UNIT | Facility: HOSPITAL | Age: 39
End: 2019-07-26

## 2019-07-26 NOTE — PROGRESS NOTES
S;  Pre-admit info call placed. Pt scheduled for Repeat C/S with possible Hilario. Salphingectomy. B;    @ 38.5 wks gest. hx prev. C/S. No prenatal complications except AMA. A;  Q&A done, C/S info given.  Pt aware of NPO at midnight, pt has skin prep

## 2019-07-27 ENCOUNTER — APPOINTMENT (OUTPATIENT)
Dept: LAB | Facility: HOSPITAL | Age: 39
End: 2019-07-27
Attending: OBSTETRICS & GYNECOLOGY
Payer: COMMERCIAL

## 2019-07-27 LAB
ANTIBODY SCREEN: NEGATIVE
BILIRUB UR QL: NEGATIVE
COLOR UR: YELLOW
GLUCOSE UR-MCNC: NEGATIVE MG/DL
HGB UR QL STRIP.AUTO: NEGATIVE
KETONES UR-MCNC: NEGATIVE MG/DL
LEUKOCYTE ESTERASE UR QL STRIP.AUTO: NEGATIVE
NITRITE UR QL STRIP.AUTO: NEGATIVE
PH UR: 5 [PH] (ref 5–8)
PROT UR-MCNC: NEGATIVE MG/DL
RBC #/AREA URNS AUTO: <1 /HPF
RH BLOOD TYPE: POSITIVE
SP GR UR STRIP: 1.02 (ref 1–1.03)
UROBILINOGEN UR STRIP-ACNC: <2
VIT C UR-MCNC: NEGATIVE MG/DL
WBC #/AREA URNS AUTO: 3 /HPF

## 2019-07-27 PROCEDURE — 81001 URINALYSIS AUTO W/SCOPE: CPT

## 2019-07-27 PROCEDURE — 86901 BLOOD TYPING SEROLOGIC RH(D): CPT

## 2019-07-27 PROCEDURE — 86850 RBC ANTIBODY SCREEN: CPT

## 2019-07-27 PROCEDURE — 36415 COLL VENOUS BLD VENIPUNCTURE: CPT

## 2019-07-27 PROCEDURE — 86900 BLOOD TYPING SEROLOGIC ABO: CPT

## 2019-07-28 ENCOUNTER — TELEPHONE (OUTPATIENT)
Dept: OBGYN CLINIC | Facility: CLINIC | Age: 39
End: 2019-07-28

## 2019-07-28 DIAGNOSIS — Z01.818 PREOP TESTING: Primary | ICD-10-CM

## 2019-07-29 ENCOUNTER — HOSPITAL ENCOUNTER (INPATIENT)
Facility: HOSPITAL | Age: 39
LOS: 3 days | Discharge: HOME | End: 2019-08-01
Attending: OBSTETRICS & GYNECOLOGY | Admitting: OBSTETRICS & GYNECOLOGY
Payer: COMMERCIAL

## 2019-07-29 ENCOUNTER — ANESTHESIA EVENT (OUTPATIENT)
Dept: OBGYN UNIT | Facility: HOSPITAL | Age: 39
End: 2019-07-29
Payer: COMMERCIAL

## 2019-07-29 ENCOUNTER — ANESTHESIA (OUTPATIENT)
Dept: OBGYN UNIT | Facility: HOSPITAL | Age: 39
End: 2019-07-29
Payer: COMMERCIAL

## 2019-07-29 PROBLEM — O34.219 PREVIOUS CESAREAN DELIVERY AFFECTING PREGNANCY: Status: ACTIVE | Noted: 2019-07-29

## 2019-07-29 LAB
BASOPHILS # BLD AUTO: 0.04 X10(3) UL (ref 0–0.2)
BASOPHILS NFR BLD AUTO: 0.4 %
DEPRECATED RDW RBC AUTO: 49.2 FL (ref 35.1–46.3)
EOSINOPHIL # BLD AUTO: 0.04 X10(3) UL (ref 0–0.7)
EOSINOPHIL NFR BLD AUTO: 0.4 %
ERYTHROCYTE [DISTWIDTH] IN BLOOD BY AUTOMATED COUNT: 13.8 % (ref 11–15)
HCT VFR BLD AUTO: 40.6 % (ref 35–48)
HGB BLD-MCNC: 13.6 G/DL (ref 12–16)
IMM GRANULOCYTES # BLD AUTO: 0.14 X10(3) UL (ref 0–1)
IMM GRANULOCYTES NFR BLD: 1.6 %
LYMPHOCYTES # BLD AUTO: 2.33 X10(3) UL (ref 1–4)
LYMPHOCYTES NFR BLD AUTO: 25.9 %
MCH RBC QN AUTO: 32.5 PG (ref 26–34)
MCHC RBC AUTO-ENTMCNC: 33.5 G/DL (ref 31–37)
MCV RBC AUTO: 97.1 FL (ref 80–100)
MONOCYTES # BLD AUTO: 0.67 X10(3) UL (ref 0.1–1)
MONOCYTES NFR BLD AUTO: 7.4 %
NEUTROPHILS # BLD AUTO: 5.79 X10 (3) UL (ref 1.5–7.7)
NEUTROPHILS # BLD AUTO: 5.79 X10(3) UL (ref 1.5–7.7)
NEUTROPHILS NFR BLD AUTO: 64.3 %
PLATELET # BLD AUTO: 179 10(3)UL (ref 150–450)
RBC # BLD AUTO: 4.18 X10(6)UL (ref 3.8–5.3)
WBC # BLD AUTO: 9 X10(3) UL (ref 4–11)

## 2019-07-29 PROCEDURE — 58611 LIGATE OVIDUCT(S) ADD-ON: CPT | Performed by: OBSTETRICS & GYNECOLOGY

## 2019-07-29 PROCEDURE — 59514 CESAREAN DELIVERY ONLY: CPT | Performed by: OBSTETRICS & GYNECOLOGY

## 2019-07-29 PROCEDURE — 0UT70ZZ RESECTION OF BILATERAL FALLOPIAN TUBES, OPEN APPROACH: ICD-10-PCS | Performed by: OBSTETRICS & GYNECOLOGY

## 2019-07-29 PROCEDURE — 59510 CESAREAN DELIVERY: CPT | Performed by: OBSTETRICS & GYNECOLOGY

## 2019-07-29 RX ORDER — METOCLOPRAMIDE 10 MG/1
10 TABLET ORAL ONCE
Status: COMPLETED | OUTPATIENT
Start: 2019-07-29 | End: 2019-07-29

## 2019-07-29 RX ORDER — NALOXONE HYDROCHLORIDE 0.4 MG/ML
0.08 INJECTION, SOLUTION INTRAMUSCULAR; INTRAVENOUS; SUBCUTANEOUS
Status: ACTIVE | OUTPATIENT
Start: 2019-07-29 | End: 2019-07-30

## 2019-07-29 RX ORDER — SODIUM CHLORIDE 0.9 % (FLUSH) 0.9 %
10 SYRINGE (ML) INJECTION AS NEEDED
Status: DISCONTINUED | OUTPATIENT
Start: 2019-07-29 | End: 2019-08-01

## 2019-07-29 RX ORDER — AMMONIA INHALANTS 0.04 G/.3ML
0.3 INHALANT RESPIRATORY (INHALATION) AS NEEDED
Status: DISCONTINUED | OUTPATIENT
Start: 2019-07-29 | End: 2019-08-01

## 2019-07-29 RX ORDER — DIPHENHYDRAMINE HYDROCHLORIDE 50 MG/ML
25 INJECTION INTRAMUSCULAR; INTRAVENOUS ONCE AS NEEDED
Status: ACTIVE | OUTPATIENT
Start: 2019-07-29 | End: 2019-07-29

## 2019-07-29 RX ORDER — KETOROLAC TROMETHAMINE 30 MG/ML
30 INJECTION, SOLUTION INTRAMUSCULAR; INTRAVENOUS ONCE AS NEEDED
Status: ACTIVE | OUTPATIENT
Start: 2019-07-29 | End: 2019-07-29

## 2019-07-29 RX ORDER — METOCLOPRAMIDE HYDROCHLORIDE 5 MG/ML
10 INJECTION INTRAMUSCULAR; INTRAVENOUS ONCE
Status: COMPLETED | OUTPATIENT
Start: 2019-07-29 | End: 2019-07-29

## 2019-07-29 RX ORDER — BISACODYL 10 MG
10 SUPPOSITORY, RECTAL RECTAL
Status: DISCONTINUED | OUTPATIENT
Start: 2019-07-29 | End: 2019-08-01

## 2019-07-29 RX ORDER — SODIUM CHLORIDE 0.9 % (FLUSH) 0.9 %
10 SYRINGE (ML) INJECTION AS NEEDED
Status: DISCONTINUED | OUTPATIENT
Start: 2019-07-29 | End: 2019-07-29 | Stop reason: HOSPADM

## 2019-07-29 RX ORDER — HALOPERIDOL 5 MG/ML
0.5 INJECTION INTRAMUSCULAR ONCE AS NEEDED
Status: ACTIVE | OUTPATIENT
Start: 2019-07-29 | End: 2019-07-29

## 2019-07-29 RX ORDER — DIPHENHYDRAMINE HYDROCHLORIDE 50 MG/ML
12.5 INJECTION INTRAMUSCULAR; INTRAVENOUS EVERY 4 HOURS PRN
Status: ACTIVE | OUTPATIENT
Start: 2019-07-29 | End: 2019-07-30

## 2019-07-29 RX ORDER — HYDROCODONE BITARTRATE AND ACETAMINOPHEN 7.5; 325 MG/1; MG/1
1 TABLET ORAL EVERY 6 HOURS PRN
Status: DISPENSED | OUTPATIENT
Start: 2019-07-29 | End: 2019-07-30

## 2019-07-29 RX ORDER — TRISODIUM CITRATE DIHYDRATE AND CITRIC ACID MONOHYDRATE 500; 334 MG/5ML; MG/5ML
30 SOLUTION ORAL ONCE
Status: DISCONTINUED | OUTPATIENT
Start: 2019-07-29 | End: 2019-07-29 | Stop reason: HOSPADM

## 2019-07-29 RX ORDER — SODIUM PHOSPHATE, DIBASIC AND SODIUM PHOSPHATE, MONOBASIC 7; 19 G/133ML; G/133ML
1 ENEMA RECTAL ONCE AS NEEDED
Status: DISCONTINUED | OUTPATIENT
Start: 2019-07-29 | End: 2019-08-01

## 2019-07-29 RX ORDER — SODIUM CHLORIDE, SODIUM LACTATE, POTASSIUM CHLORIDE, CALCIUM CHLORIDE 600; 310; 30; 20 MG/100ML; MG/100ML; MG/100ML; MG/100ML
INJECTION, SOLUTION INTRAVENOUS CONTINUOUS
Status: DISCONTINUED | OUTPATIENT
Start: 2019-07-29 | End: 2019-07-29 | Stop reason: HOSPADM

## 2019-07-29 RX ORDER — TRISODIUM CITRATE DIHYDRATE AND CITRIC ACID MONOHYDRATE 500; 334 MG/5ML; MG/5ML
30 SOLUTION ORAL ONCE
Status: COMPLETED | OUTPATIENT
Start: 2019-07-29 | End: 2019-07-29

## 2019-07-29 RX ORDER — IBUPROFEN 600 MG/1
600 TABLET ORAL EVERY 6 HOURS PRN
Status: DISCONTINUED | OUTPATIENT
Start: 2019-07-30 | End: 2019-08-01

## 2019-07-29 RX ORDER — HYDROCODONE BITARTRATE AND ACETAMINOPHEN 7.5; 325 MG/1; MG/1
2 TABLET ORAL EVERY 6 HOURS PRN
Status: ACTIVE | OUTPATIENT
Start: 2019-07-29 | End: 2019-07-30

## 2019-07-29 RX ORDER — ONDANSETRON 2 MG/ML
4 INJECTION INTRAMUSCULAR; INTRAVENOUS EVERY 6 HOURS PRN
Status: DISCONTINUED | OUTPATIENT
Start: 2019-07-29 | End: 2019-07-29

## 2019-07-29 RX ORDER — SIMETHICONE 80 MG
80 TABLET,CHEWABLE ORAL 3 TIMES DAILY PRN
Status: DISCONTINUED | OUTPATIENT
Start: 2019-07-29 | End: 2019-08-01

## 2019-07-29 RX ORDER — ONDANSETRON 2 MG/ML
4 INJECTION INTRAMUSCULAR; INTRAVENOUS EVERY 6 HOURS PRN
Status: DISCONTINUED | OUTPATIENT
Start: 2019-07-29 | End: 2019-08-01

## 2019-07-29 RX ORDER — KETOROLAC TROMETHAMINE 30 MG/ML
30 INJECTION, SOLUTION INTRAMUSCULAR; INTRAVENOUS EVERY 6 HOURS PRN
Status: DISPENSED | OUTPATIENT
Start: 2019-07-29 | End: 2019-07-30

## 2019-07-29 RX ORDER — BUPIVACAINE HYDROCHLORIDE 7.5 MG/ML
INJECTION, SOLUTION INTRASPINAL AS NEEDED
Status: DISCONTINUED | OUTPATIENT
Start: 2019-07-29 | End: 2019-07-29 | Stop reason: SURG

## 2019-07-29 RX ORDER — CHOLECALCIFEROL (VITAMIN D3) 25 MCG
1 TABLET,CHEWABLE ORAL DAILY
Status: DISCONTINUED | OUTPATIENT
Start: 2019-07-29 | End: 2019-08-01

## 2019-07-29 RX ORDER — MORPHINE SULFATE 1 MG/ML
INJECTION, SOLUTION EPIDURAL; INTRATHECAL; INTRAVENOUS AS NEEDED
Status: DISCONTINUED | OUTPATIENT
Start: 2019-07-29 | End: 2019-07-29

## 2019-07-29 RX ORDER — MIDAZOLAM HYDROCHLORIDE 1 MG/ML
INJECTION INTRAMUSCULAR; INTRAVENOUS AS NEEDED
Status: DISCONTINUED | OUTPATIENT
Start: 2019-07-29 | End: 2019-07-29 | Stop reason: SURG

## 2019-07-29 RX ORDER — PROCHLORPERAZINE EDISYLATE 5 MG/ML
5 INJECTION INTRAMUSCULAR; INTRAVENOUS ONCE AS NEEDED
Status: ACTIVE | OUTPATIENT
Start: 2019-07-29 | End: 2019-07-29

## 2019-07-29 RX ORDER — SODIUM CHLORIDE, SODIUM LACTATE, POTASSIUM CHLORIDE, CALCIUM CHLORIDE 600; 310; 30; 20 MG/100ML; MG/100ML; MG/100ML; MG/100ML
INJECTION, SOLUTION INTRAVENOUS CONTINUOUS
Status: DISCONTINUED | OUTPATIENT
Start: 2019-07-29 | End: 2019-08-01

## 2019-07-29 RX ORDER — CHOLECALCIFEROL (VITAMIN D3) 25 MCG
1 TABLET,CHEWABLE ORAL
Status: DISCONTINUED | OUTPATIENT
Start: 2019-07-29 | End: 2019-07-29

## 2019-07-29 RX ORDER — DOCUSATE SODIUM 100 MG/1
100 CAPSULE, LIQUID FILLED ORAL
Status: DISCONTINUED | OUTPATIENT
Start: 2019-07-29 | End: 2019-08-01

## 2019-07-29 RX ORDER — CEFAZOLIN SODIUM/WATER 2 G/20 ML
2 SYRINGE (ML) INTRAVENOUS ONCE
Status: COMPLETED | OUTPATIENT
Start: 2019-07-29 | End: 2019-07-29

## 2019-07-29 RX ORDER — ONDANSETRON 2 MG/ML
4 INJECTION INTRAMUSCULAR; INTRAVENOUS ONCE AS NEEDED
Status: ACTIVE | OUTPATIENT
Start: 2019-07-29 | End: 2019-07-29

## 2019-07-29 RX ORDER — NALBUPHINE HCL 10 MG/ML
2.5 AMPUL (ML) INJECTION EVERY 4 HOURS PRN
Status: ACTIVE | OUTPATIENT
Start: 2019-07-29 | End: 2019-07-30

## 2019-07-29 RX ORDER — FAMOTIDINE 20 MG/1
20 TABLET ORAL ONCE
Status: COMPLETED | OUTPATIENT
Start: 2019-07-29 | End: 2019-07-29

## 2019-07-29 RX ORDER — POLYETHYLENE GLYCOL 3350 17 G/17G
17 POWDER, FOR SOLUTION ORAL DAILY PRN
Status: DISCONTINUED | OUTPATIENT
Start: 2019-07-29 | End: 2019-08-01

## 2019-07-29 RX ORDER — ACETAMINOPHEN 325 MG/1
650 TABLET ORAL EVERY 6 HOURS PRN
Status: ACTIVE | OUTPATIENT
Start: 2019-07-29 | End: 2019-07-30

## 2019-07-29 RX ORDER — HYDROCODONE BITARTRATE AND ACETAMINOPHEN 5; 325 MG/1; MG/1
1 TABLET ORAL EVERY 6 HOURS PRN
Status: DISCONTINUED | OUTPATIENT
Start: 2019-07-30 | End: 2019-08-01

## 2019-07-29 RX ORDER — NALBUPHINE HCL 10 MG/ML
2.5 AMPUL (ML) INJECTION
Status: DISCONTINUED | OUTPATIENT
Start: 2019-07-29 | End: 2019-08-01

## 2019-07-29 RX ORDER — MORPHINE SULFATE 1 MG/ML
INJECTION, SOLUTION EPIDURAL; INTRATHECAL; INTRAVENOUS AS NEEDED
Status: DISCONTINUED | OUTPATIENT
Start: 2019-07-29 | End: 2019-07-29 | Stop reason: SURG

## 2019-07-29 RX ORDER — DEXTROSE, SODIUM CHLORIDE, SODIUM LACTATE, POTASSIUM CHLORIDE, AND CALCIUM CHLORIDE 5; .6; .31; .03; .02 G/100ML; G/100ML; G/100ML; G/100ML; G/100ML
INJECTION, SOLUTION INTRAVENOUS CONTINUOUS
Status: DISCONTINUED | OUTPATIENT
Start: 2019-07-29 | End: 2019-08-01

## 2019-07-29 RX ORDER — FAMOTIDINE 10 MG/ML
20 INJECTION, SOLUTION INTRAVENOUS ONCE
Status: COMPLETED | OUTPATIENT
Start: 2019-07-29 | End: 2019-07-29

## 2019-07-29 RX ORDER — HYDROMORPHONE HYDROCHLORIDE 1 MG/ML
0.4 INJECTION, SOLUTION INTRAMUSCULAR; INTRAVENOUS; SUBCUTANEOUS
Status: ACTIVE | OUTPATIENT
Start: 2019-07-29 | End: 2019-07-30

## 2019-07-29 RX ORDER — DIPHENHYDRAMINE HCL 25 MG
25 CAPSULE ORAL EVERY 4 HOURS PRN
Status: ACTIVE | OUTPATIENT
Start: 2019-07-29 | End: 2019-07-30

## 2019-07-29 RX ORDER — HYDROMORPHONE HYDROCHLORIDE 1 MG/ML
0.6 INJECTION, SOLUTION INTRAMUSCULAR; INTRAVENOUS; SUBCUTANEOUS
Status: ACTIVE | OUTPATIENT
Start: 2019-07-29 | End: 2019-07-30

## 2019-07-29 RX ADMIN — SODIUM CHLORIDE, SODIUM LACTATE, POTASSIUM CHLORIDE, CALCIUM CHLORIDE: 600; 310; 30; 20 INJECTION, SOLUTION INTRAVENOUS at 10:16:00

## 2019-07-29 RX ADMIN — BUPIVACAINE HYDROCHLORIDE 1.5 ML: 7.5 INJECTION, SOLUTION INTRASPINAL at 10:17:00

## 2019-07-29 RX ADMIN — MIDAZOLAM HYDROCHLORIDE 2 MG: 1 INJECTION INTRAMUSCULAR; INTRAVENOUS at 11:10:00

## 2019-07-29 RX ADMIN — ONDANSETRON 4 MG: 2 INJECTION INTRAMUSCULAR; INTRAVENOUS at 11:10:00

## 2019-07-29 RX ADMIN — SODIUM CHLORIDE, SODIUM LACTATE, POTASSIUM CHLORIDE, CALCIUM CHLORIDE: 600; 310; 30; 20 INJECTION, SOLUTION INTRAVENOUS at 11:19:00

## 2019-07-29 RX ADMIN — CEFAZOLIN SODIUM/WATER 2 G: 2 G/20 ML SYRINGE (ML) INTRAVENOUS at 10:19:00

## 2019-07-29 RX ADMIN — MORPHINE SULFATE 0.2 MG: 1 INJECTION, SOLUTION EPIDURAL; INTRATHECAL; INTRAVENOUS at 10:16:00

## 2019-07-29 NOTE — ANESTHESIA PROCEDURE NOTES
Spinal Block  Date/Time: 7/29/2019 10:14 AM  Performed by: Maria Elena Wagner MD  Authorized by: Maria Elena Wagner MD     Patient Location:  OR  Start Time:  7/29/2019 10:14 AM  End Time:  7/29/2019 10:16 AM  Site identification: surface landmarks    Reason for

## 2019-07-29 NOTE — LACTATION NOTE
LACTATION NOTE - MOTHER      Evaluation Type: Inpatient    Problems identified  Problems identified: Milk supply WNL; Knowledge deficit    Maternal history  Maternal history: Gestational diabetes; AMA    Breastfeeding goal  Breastfeeding goal: To maintain br

## 2019-07-29 NOTE — OPERATIVE REPORT
Operative Report for  Section:    Date: 19  Patient: Mini Velasquez  : 1980  MRN: [id number]    Preoperative Diagnosis: Intrauterine Pregnancy 39w1d, Previous CS, Declines ,  Desires Permanent Sterilization  Postoperative Diagnosi underlying layer of fascia. The fascia was then incised in the midline and the incision extended laterally with the tapia scissors.   The superior aspect of the fascial incision was then grasped with Kocher clamps, elevated, and the underlying rectus muscle returned to the abdomen. The gutters were cleared of all clots. Excellent hemostasis was noted again. The fascia was reapproximated with 0-Vicryl in a running fashion. Irrigation was carried out.   The subcutaneous adipose layer was approximated with Amanda

## 2019-07-29 NOTE — H&P
Reyna Robledo 139 Patient Status:  Inpatient    1980 MRN [id number]   Location [address] Attending Fausto Beaver DO   Hosp Day # 0 PCP Addis Guzman MD     Date bilateral            Salpingectomy                        7/9/2019: Still undecided re: bilateral            salpingectomy -- will inform us of final decision            at next visit.             6/19/19-- considering bilateral salpingectomy            Rep Result Value Ref Range    WBC 9.0 4.0 - 11.0 x10(3) uL    RBC 4.18 3.80 - 5.30 x10(6)uL    HGB 13.6 12.0 - 16.0 g/dL    HCT 40.6 35.0 - 48.0 %    MCV 97.1 80.0 - 100.0 fL    MCH 32.5 26.0 - 34.0 pg    MCHC 33.5 31.0 - 37.0 g/dL    RDW-SD 49.2 (H) 35.1 -

## 2019-07-29 NOTE — ANESTHESIA PREPROCEDURE EVALUATION
Anesthesia PreOp Note    HPI:     Sommer Roldan is a 45year old female who presents for preoperative consultation requested by: Paulo Salcedo DO    Date of Surgery: 2019    Procedure(s):   SECTION  BILATERAL SALPINGECTOMY  Indication: Intravenous Once Home Dale, DO    ondansetron HCl [facility]) injection 4 mg 4 mg Intravenous Q6H PRN Home Dale, DO      No current [facility]-ordered outpatient medications on file.     No Known Allergies    Family History   Problem Relation Age o 07/29/2019    MCV 97.1 07/29/2019    MCH 32.5 07/29/2019    MCHC 33.5 07/29/2019    RDW 13.8 07/29/2019    .0 07/29/2019             Vital Signs: Body mass index is 22.83 kg/m². height is 1.626 m (5' 4\") and weight is 60.3 kg (133 lb).  Her oral

## 2019-07-29 NOTE — PROGRESS NOTES
Pt is a 45year old female admitted to TR4/TR4-A. Patient presents with:  Scheduled      Pt is  39w1d intra-uterine pregnancy. History obtained, consents signed. Oriented to room, staff, and plan of care.

## 2019-07-29 NOTE — LACTATION NOTE
This note was copied from a baby's chart.   LACTATION NOTE - INFANT    Evaluation Type  Evaluation Type: Inpatient    Problems & Assessment  Problems: comment/detail: BS checks WNL  Infant Assessment: Hunger cues present;Skin color: pink or appropriate for

## 2019-07-29 NOTE — PLAN OF CARE
Problem: Patient Centered Care  Goal: Patient preferences are identified and integrated in the patient's plan of care  Description  Interventions:  - What would you like us to know as we care for you?  2nd csection  - Provide timely, complete, and accurat Obtain immunization and exposure to communicable diseases history. Outcome: Progressing  Goal: Optimize infant feeding at the breast  Description  INTERVENTIONS:  - Initiate breast feeding within first hour after birth.    - Monitor effectiveness of curren  bonding  Description  INTERVENTIONS:  - Assess caregiver- interactions. - Assess caregiver's emotional status and coping mechanisms. - Encourage caregiver to participate in  daily care.   - Assess support systems available to mother/ scan as needed  - Follow urinary retention protocol/standard of care  - Consider collaborating with pharmacy to review patient's medication profile  - Implement strategies to promote bladder emptying  Outcome: Progressing     Problem: GASTROINTESTINAL - AD

## 2019-07-29 NOTE — DISCHARGE SUMMARY
[facility]    Discharge Summary    Dory Garnica Patient Status:  Inpatient    1980 MRN [id number]   Location [address] [facility] Attending Jo Ann Be, [address] [facility] Day # 3       Delivering OB Clinician:

## 2019-07-29 NOTE — PLAN OF CARE
Problem: Patient Centered Care  Goal: Patient preferences are identified and integrated in the patient's plan of care  Description  Interventions:  - What would you like us to know as we care for you?  FROM SERBIA, VERY NERVOUS  - Provide timely, complete techniques  - Monitor for opioid side effects  - Notify MD/LIP if interventions unsuccessful or patient reports new pain  - Anticipate increased pain with activity and pre-medicate as appropriate  Outcome: Progressing     Problem: ANXIETY  Goal: Will repor

## 2019-07-29 NOTE — ANESTHESIA POSTPROCEDURE EVALUATION
Patient: Romi Nguyen    Procedure Summary     Date:  19 Room / Location:  [facility] L+D OR  [facility] L+D OR    Anesthesia Start:  1014 Anesthesia Stop:      Procedures:        SECTION (N/A )      BILATERAL SALPINGECTOMY (Bilateral ) Diagnosis:  (

## 2019-07-30 LAB
BASOPHILS # BLD AUTO: 0.03 X10(3) UL (ref 0–0.2)
BASOPHILS NFR BLD AUTO: 0.3 %
DEPRECATED RDW RBC AUTO: 49.6 FL (ref 35.1–46.3)
EOSINOPHIL # BLD AUTO: 0.08 X10(3) UL (ref 0–0.7)
EOSINOPHIL NFR BLD AUTO: 0.8 %
ERYTHROCYTE [DISTWIDTH] IN BLOOD BY AUTOMATED COUNT: 13.9 % (ref 11–15)
HCT VFR BLD AUTO: 32.5 % (ref 35–48)
HGB BLD-MCNC: 10.9 G/DL (ref 12–16)
IMM GRANULOCYTES # BLD AUTO: 0.06 X10(3) UL (ref 0–1)
IMM GRANULOCYTES NFR BLD: 0.6 %
LYMPHOCYTES # BLD AUTO: 1.54 X10(3) UL (ref 1–4)
LYMPHOCYTES NFR BLD AUTO: 14.7 %
MCH RBC QN AUTO: 32.8 PG (ref 26–34)
MCHC RBC AUTO-ENTMCNC: 33.5 G/DL (ref 31–37)
MCV RBC AUTO: 97.9 FL (ref 80–100)
MONOCYTES # BLD AUTO: 0.71 X10(3) UL (ref 0.1–1)
MONOCYTES NFR BLD AUTO: 6.8 %
NEUTROPHILS # BLD AUTO: 8.05 X10 (3) UL (ref 1.5–7.7)
NEUTROPHILS # BLD AUTO: 8.05 X10(3) UL (ref 1.5–7.7)
NEUTROPHILS NFR BLD AUTO: 76.8 %
PLATELET # BLD AUTO: 154 10(3)UL (ref 150–450)
RBC # BLD AUTO: 3.32 X10(6)UL (ref 3.8–5.3)
WBC # BLD AUTO: 10.5 X10(3) UL (ref 4–11)

## 2019-07-30 NOTE — PLAN OF CARE
Problem: POSTPARTUM  Goal: Long Term Goal:Experiences normal postpartum course  Description  INTERVENTIONS:  - Assess and monitor vital signs and lab values. - Assess fundus and lochia. - Provide ice/sitz baths for perineum discomfort.   - Monitor heali or substances incompatible with breast feeding.  - Assess and monitor for signs of nipple pain/trauma. - Instruct and provide assistance with proper latch. - Review techniques for milk expression (breast pumping) and storage of breast milk.  Provide pumpi midline  Lochia rubra and small  Voiding per gagnon, to be d/c'd this AM  Breastfeeding, recognizes infant feeding cues  Bonding well with infant  Will continue plan of care

## 2019-07-30 NOTE — ANESTHESIA POSTPROCEDURE EVALUATION
Patient: Orquidea Rm    Procedure Summary     Date:  19 Room / Location:  [facility] L+D OR 02 / [facility] L+D OR    Anesthesia Start:   Anesthesia Stop:  5879    Procedures:        SECTION (N/A Abdomen)      BILATERAL SALPINGECTOMY (Bilateral Uter

## 2019-07-31 NOTE — LACTATION NOTE
LACTATION NOTE - MOTHER      Evaluation Type: Inpatient    Problems identified  Problems identified: Knowledge deficit;Milk supply WNL    Maternal history  Maternal history: AMA; Gestational diabetes    Breastfeeding goal  Breastfeeding goal: To maintain br

## 2019-07-31 NOTE — PROGRESS NOTES
[facility]    OB/Gyne Post  Section Progress Note      [address] Hilario Patient Status:  Inpatient    1980 MRN [id number]   Location [facility] 3SE Attending Home Dale, [address] [facility] Day # 2 PCP RADHA Nelson

## 2019-07-31 NOTE — PROGRESS NOTES
[facility]    OB/Gyne Postpartum Progress Note      [address] Hilario Patient Status:  Inpatient    1980 MRN [id number]   Location [facility] 3SE Attending Jo Ann Be, [address] [facility] Day # 2 PCP Jaimee Bradford MD       Sub

## 2019-08-01 VITALS
HEIGHT: 64 IN | BODY MASS INDEX: 22.71 KG/M2 | DIASTOLIC BLOOD PRESSURE: 58 MMHG | RESPIRATION RATE: 16 BRPM | TEMPERATURE: 98 F | WEIGHT: 133 LBS | HEART RATE: 66 BPM | OXYGEN SATURATION: 98 % | SYSTOLIC BLOOD PRESSURE: 96 MMHG

## 2019-08-01 RX ORDER — PSEUDOEPHEDRINE HCL 30 MG
100 TABLET ORAL DAILY
Qty: 30 CAPSULE | Refills: 0 | Status: SHIPPED | OUTPATIENT
Start: 2019-08-01

## 2019-08-01 RX ORDER — IBUPROFEN 600 MG/1
600 TABLET ORAL EVERY 6 HOURS PRN
Qty: 30 TABLET | Refills: 0 | Status: SHIPPED | OUTPATIENT
Start: 2019-08-01

## 2019-08-01 RX ORDER — HYDROCODONE BITARTRATE AND ACETAMINOPHEN 5; 325 MG/1; MG/1
1 TABLET ORAL EVERY 6 HOURS PRN
Qty: 30 TABLET | Refills: 0 | Status: SHIPPED | OUTPATIENT
Start: 2019-08-01

## 2019-08-01 NOTE — LACTATION NOTE
LACTATION NOTE - MOTHER      Evaluation Type: Inpatient    Problems identified  Problems identified: Milk supply WNL; Knowledge deficit    Maternal history  Maternal history: AMA; Gestational diabetes    Breastfeeding goal  Breastfeeding goal: To maintain br

## 2019-08-18 RX ORDER — CHOLECALCIFEROL (VITAMIN D3) 25 MCG
1 TABLET,CHEWABLE ORAL
Qty: 30 CAPSULE | Refills: 5 | Status: CANCELLED | OUTPATIENT
Start: 2019-08-18

## 2019-08-24 NOTE — ADDENDUM NOTE
Encounter addended by: Rhonda Mccall DO on: 8/24/2019 1:57 PM   Actions taken: Sign clinical note, Charge Capture section accepted

## 2019-09-18 ENCOUNTER — POSTPARTUM (OUTPATIENT)
Dept: OBGYN CLINIC | Facility: CLINIC | Age: 39
End: 2019-09-18

## 2019-09-18 VITALS
HEART RATE: 71 BPM | BODY MASS INDEX: 19 KG/M2 | WEIGHT: 108 LBS | SYSTOLIC BLOOD PRESSURE: 110 MMHG | DIASTOLIC BLOOD PRESSURE: 62 MMHG

## 2019-09-18 PROBLEM — Z33.1 PREGNANT STATE, INCIDENTAL: Status: RESOLVED | Noted: 2019-03-13 | Resolved: 2019-09-18

## 2019-09-18 PROBLEM — M25.552 PAIN OF LEFT HIP JOINT: Status: RESOLVED | Noted: 2019-07-02 | Resolved: 2019-09-18

## 2019-09-18 PROBLEM — O09.529 ANTEPARTUM MULTIGRAVIDA OF ADVANCED MATERNAL AGE: Status: RESOLVED | Noted: 2019-01-16 | Resolved: 2019-09-18

## 2019-09-18 PROBLEM — O34.219 PREVIOUS CESAREAN DELIVERY AFFECTING PREGNANCY: Status: RESOLVED | Noted: 2019-07-29 | Resolved: 2019-09-18

## 2019-09-18 PROBLEM — Z98.891 PREVIOUS CESAREAN SECTION: Status: RESOLVED | Noted: 2019-01-16 | Resolved: 2019-09-18

## 2019-09-18 NOTE — H&P
HPI    Igor Lowry is a 45year old female  here for 6 week post-partum visit. Patient delivered a  male infant on 19 via repeat CSx with bilatral salpingectomy. Patient is breast feeding.    Patient denies symptoms of depression, Radha PRESCRIPTION NEEDED, Disp: 30 capsule, Rfl: 0    ALLERGIES:  No Known Allergies    PHYSICAL EXAM  Blood pressure 110/62, pulse 71, weight 108 lb (49 kg), last menstrual period 11/10/2018, currently breastfeeding.   General:  Well nourished, well developed w

## 2019-10-15 ENCOUNTER — OFFICE VISIT (OUTPATIENT)
Dept: OBGYN CLINIC | Facility: CLINIC | Age: 39
End: 2019-10-15

## 2019-10-15 VITALS
WEIGHT: 110 LBS | SYSTOLIC BLOOD PRESSURE: 111 MMHG | DIASTOLIC BLOOD PRESSURE: 72 MMHG | HEART RATE: 79 BPM | BODY MASS INDEX: 19 KG/M2

## 2019-10-15 DIAGNOSIS — R87.810 ASCUS WITH POSITIVE HIGH RISK HPV CERVICAL: Primary | ICD-10-CM

## 2019-10-15 DIAGNOSIS — R87.610 ASCUS WITH POSITIVE HIGH RISK HPV CERVICAL: Primary | ICD-10-CM

## 2019-10-15 PROCEDURE — 57456 ENDOCERV CURETTAGE W/SCOPE: CPT | Performed by: OBSTETRICS & GYNECOLOGY

## 2019-10-15 NOTE — PROCEDURES
Colpo with Endocervical Curettage    Pregnancy Results: negative from urine test   Birth control method(s) used: TL    Consent signed. Procedure discussed with patient in detail including indication, risk, benefits, alternatives and complications.     In

## 2019-11-05 ENCOUNTER — TELEPHONE (OUTPATIENT)
Dept: OBGYN CLINIC | Facility: CLINIC | Age: 39
End: 2019-11-05

## 2019-11-05 NOTE — TELEPHONE ENCOUNTER
PT CAME FOR COLPO THIS MORNING BUT PERIOD STARTED SO WAS ADVISED BY TANISHA TO SCHEDULE NEXT WEEK. BOOKED APPT ON FRI, 11-15-19.

## 2019-11-15 ENCOUNTER — OFFICE VISIT (OUTPATIENT)
Dept: OBGYN CLINIC | Facility: CLINIC | Age: 39
End: 2019-11-15

## 2019-11-15 VITALS
DIASTOLIC BLOOD PRESSURE: 66 MMHG | BODY MASS INDEX: 19 KG/M2 | HEART RATE: 80 BPM | WEIGHT: 110 LBS | SYSTOLIC BLOOD PRESSURE: 105 MMHG

## 2019-11-15 DIAGNOSIS — R87.810 ASCUS WITH POSITIVE HIGH RISK HPV CERVICAL: Primary | ICD-10-CM

## 2019-11-15 DIAGNOSIS — R87.610 ASCUS WITH POSITIVE HIGH RISK HPV CERVICAL: Primary | ICD-10-CM

## 2019-11-15 PROCEDURE — 57505 ENDOCERVICAL CURETTAGE: CPT | Performed by: OBSTETRICS & GYNECOLOGY

## 2019-11-15 NOTE — PROCEDURES
Endocervical Curettage     BC: salpingectomy    Consent signed. Procedure discussed with patient in detail including indication, risk, benefits, alternatives and complications.     Indication: previous ECC with mild dysplasia and areas concerning for sev

## 2019-11-18 ENCOUNTER — PATIENT MESSAGE (OUTPATIENT)
Dept: OBGYN CLINIC | Facility: CLINIC | Age: 39
End: 2019-11-18

## 2019-11-18 NOTE — TELEPHONE ENCOUNTER
From: Kirby Blind  To: Roger Chavira DO  Sent: 11/18/2019 11:31 AM CST  Subject: Test Results Question    Hi,  I have received my new test results from the last biopsy. Could you please ask the doctor about his opinion and for the further steps?

## 2019-12-18 RX ORDER — CHOLECALCIFEROL (VITAMIN D3) 25 MCG
1 TABLET,CHEWABLE ORAL
Qty: 90 CAPSULE | Refills: 0 | Status: SHIPPED | OUTPATIENT
Start: 2019-12-18

## (undated) NOTE — LETTER
[facility]  Scheduling the Birth of Your Saida Gave    You are scheduled for a  delivery on 19.   You should arrive at the [facility] at 7:30am.     Please note to have labs drawn within 72 hours of your scheduled procedur

## (undated) NOTE — LETTER
AUTHORIZATION FOR SURGICAL OPERATION OR OTHER PROCEDURE    1.  I hereby authorize Dr. Bill Finch, and [address] [place] [address] staff assigned to my case to perform the following operation and/or procedure at the [address] [place] [address]:    _______Colpo__ECC_____________ Time:  ________ A. M.  P.M.        Patient Name:  ______________________________________________________  (please print)      Patient signature:  ___________________________________________________             Relationship to Patient:

## (undated) NOTE — LETTER
AUTHORIZATION FOR SURGICAL OPERATION OR OTHER PROCEDURE    1.  I hereby authorize Dr. Henok Epstein, and [facility] staff assigned to my case to perform the following operation and/or procedure at the [facility]:  COLPOSCOPY  ____________________ Time:  ________ A. M.  P.M.        Patient Name:  ______________________________________________________  (please print)      Patient signature:  ___________________________________________________             Relationship to Patient:

## (undated) NOTE — LETTER
INSTRUCTIONS FOR PRE-SURGICAL   ANTIMICROBIAL BATH/SHOWER    Your doctor has recommended a pre-surgical CHG (chlorhexidine gluconate) shower/bath with Betasept (also sold as Hibiclens). It reduces bacteria that can potentially cause infection.   Betasept Keep out of reach of children. If swallowed get medica help or contact [employer]. Store between 60-80 degrees F. Fabric Warning! CHG WILL STAIN YOUR FABRICS! Use with care around shower curtains, towels washcloths rugs and clothes.   Wipe

## (undated) NOTE — LETTER
AUTHORIZATION FOR SURGICAL OPERATION OR OTHER PROCEDURE    1.  I hereby authorize Dr. Karan Sanford, and [facility], [facility] staff assigned to my case to perform the following operation and/or procedure at the [facility], [facility]:    COLPOSCOPY       2.  My physic Responsible person                          []  Spouse  In case of minor or                    [] Other  _____________   Incompetent name:  __________________________________________________                               (please print)      _____________

## (undated) NOTE — LETTER
VACCINE ADMINISTRATION RECORD  PARENT / GUARDIAN APPROVAL  Date: 2019  Vaccine administered to: Maria G Menjivar     : 1980    MRN: [id number]    A copy of the appropriate Centers for Disease Control and Prevention Vaccine Information statement